# Patient Record
Sex: FEMALE | Employment: OTHER | URBAN - METROPOLITAN AREA
[De-identification: names, ages, dates, MRNs, and addresses within clinical notes are randomized per-mention and may not be internally consistent; named-entity substitution may affect disease eponyms.]

---

## 2018-04-10 ENCOUNTER — HOSPITAL ENCOUNTER (INPATIENT)
Age: 73
LOS: 4 days | Discharge: HOME | DRG: 872 | End: 2018-04-14
Attending: EMERGENCY MEDICINE | Admitting: INTERNAL MEDICINE
Payer: MEDICARE

## 2018-04-10 ENCOUNTER — APPOINTMENT (OUTPATIENT)
Dept: GENERAL RADIOLOGY | Age: 73
DRG: 872 | End: 2018-04-10
Attending: EMERGENCY MEDICINE
Payer: MEDICARE

## 2018-04-10 DIAGNOSIS — N39.0 URINARY TRACT INFECTION WITHOUT HEMATURIA, SITE UNSPECIFIED: Primary | ICD-10-CM

## 2018-04-10 DIAGNOSIS — I95.9 HYPOTENSION, UNSPECIFIED HYPOTENSION TYPE: ICD-10-CM

## 2018-04-10 PROBLEM — R65.20 SEVERE SEPSIS (HCC): Status: ACTIVE | Noted: 2018-04-10

## 2018-04-10 PROBLEM — F17.200 NICOTINE DEPENDENCE: Chronic | Status: ACTIVE | Noted: 2018-04-10

## 2018-04-10 PROBLEM — E87.6 HYPOKALEMIA: Status: ACTIVE | Noted: 2018-04-10

## 2018-04-10 PROBLEM — A41.9 SEVERE SEPSIS (HCC): Status: ACTIVE | Noted: 2018-04-10

## 2018-04-10 PROBLEM — E87.1 HYPONATREMIA: Status: ACTIVE | Noted: 2018-04-10

## 2018-04-10 LAB
ALBUMIN SERPL-MCNC: 3 G/DL (ref 3.5–5)
ALBUMIN/GLOB SERPL: 0.7 {RATIO} (ref 1.1–2.2)
ALP SERPL-CCNC: 86 U/L (ref 45–117)
ALT SERPL-CCNC: 16 U/L (ref 12–78)
ANION GAP SERPL CALC-SCNC: 9 MMOL/L (ref 5–15)
APPEARANCE UR: CLEAR
AST SERPL-CCNC: 14 U/L (ref 15–37)
ATRIAL RATE: 105 BPM
BACTERIA URNS QL MICRO: ABNORMAL /HPF
BASOPHILS # BLD: 0 K/UL (ref 0–0.1)
BASOPHILS # BLD: 0 K/UL (ref 0–0.1)
BASOPHILS NFR BLD: 0 % (ref 0–1)
BASOPHILS NFR BLD: 0 % (ref 0–1)
BILIRUB SERPL-MCNC: 1.2 MG/DL (ref 0.2–1)
BILIRUB UR QL CFM: NEGATIVE
BUN SERPL-MCNC: 12 MG/DL (ref 6–20)
BUN/CREAT SERPL: 11 (ref 12–20)
CALCIUM SERPL-MCNC: 9 MG/DL (ref 8.5–10.1)
CALCULATED P AXIS, ECG09: 57 DEGREES
CALCULATED R AXIS, ECG10: 10 DEGREES
CALCULATED T AXIS, ECG11: 78 DEGREES
CHLORIDE SERPL-SCNC: 91 MMOL/L (ref 97–108)
CO2 SERPL-SCNC: 28 MMOL/L (ref 21–32)
COLOR UR: ABNORMAL
CREAT SERPL-MCNC: 1.1 MG/DL (ref 0.55–1.02)
DIAGNOSIS, 93000: NORMAL
DIFFERENTIAL METHOD BLD: ABNORMAL
DIFFERENTIAL METHOD BLD: ABNORMAL
EOSINOPHIL # BLD: 0 K/UL (ref 0–0.4)
EOSINOPHIL # BLD: 0 K/UL (ref 0–0.4)
EOSINOPHIL NFR BLD: 0 % (ref 0–7)
EOSINOPHIL NFR BLD: 0 % (ref 0–7)
EPITH CASTS URNS QL MICRO: ABNORMAL /LPF
ERYTHROCYTE [DISTWIDTH] IN BLOOD BY AUTOMATED COUNT: 12.9 % (ref 11.5–14.5)
ERYTHROCYTE [DISTWIDTH] IN BLOOD BY AUTOMATED COUNT: 12.9 % (ref 11.5–14.5)
GLOBULIN SER CALC-MCNC: 4.2 G/DL (ref 2–4)
GLUCOSE SERPL-MCNC: 199 MG/DL (ref 65–100)
GLUCOSE UR STRIP.AUTO-MCNC: NEGATIVE MG/DL
HCT VFR BLD AUTO: 41.4 % (ref 35–47)
HCT VFR BLD AUTO: 50 % (ref 35–47)
HGB BLD-MCNC: 14.9 G/DL (ref 11.5–16)
HGB BLD-MCNC: 18.2 G/DL (ref 11.5–16)
HGB UR QL STRIP: ABNORMAL
HYALINE CASTS URNS QL MICRO: ABNORMAL /LPF (ref 0–5)
IMM GRANULOCYTES # BLD: 0.1 K/UL (ref 0–0.04)
IMM GRANULOCYTES # BLD: 0.1 K/UL (ref 0–0.04)
IMM GRANULOCYTES NFR BLD AUTO: 1 % (ref 0–0.5)
IMM GRANULOCYTES NFR BLD AUTO: 1 % (ref 0–0.5)
KETONES UR QL STRIP.AUTO: NEGATIVE MG/DL
LACTATE SERPL-SCNC: 1.3 MMOL/L (ref 0.4–2)
LEUKOCYTE ESTERASE UR QL STRIP.AUTO: ABNORMAL
LYMPHOCYTES # BLD: 0.5 K/UL (ref 0.8–3.5)
LYMPHOCYTES # BLD: 0.6 K/UL (ref 0.8–3.5)
LYMPHOCYTES NFR BLD: 5 % (ref 12–49)
LYMPHOCYTES NFR BLD: 6 % (ref 12–49)
MAGNESIUM SERPL-MCNC: 1.6 MG/DL (ref 1.6–2.4)
MCH RBC QN AUTO: 32.6 PG (ref 26–34)
MCH RBC QN AUTO: 32.7 PG (ref 26–34)
MCHC RBC AUTO-ENTMCNC: 36 G/DL (ref 30–36.5)
MCHC RBC AUTO-ENTMCNC: 36.4 G/DL (ref 30–36.5)
MCV RBC AUTO: 89.6 FL (ref 80–99)
MCV RBC AUTO: 90.8 FL (ref 80–99)
MONOCYTES # BLD: 0.8 K/UL (ref 0–1)
MONOCYTES # BLD: 1.2 K/UL (ref 0–1)
MONOCYTES NFR BLD: 12 % (ref 5–13)
MONOCYTES NFR BLD: 8 % (ref 5–13)
NEUTS SEG # BLD: 7.7 K/UL (ref 1.8–8)
NEUTS SEG # BLD: 9 K/UL (ref 1.8–8)
NEUTS SEG NFR BLD: 81 % (ref 32–75)
NEUTS SEG NFR BLD: 86 % (ref 32–75)
NITRITE UR QL STRIP.AUTO: POSITIVE
NRBC # BLD: 0 K/UL (ref 0–0.01)
NRBC # BLD: 0 K/UL (ref 0–0.01)
NRBC BLD-RTO: 0 PER 100 WBC
NRBC BLD-RTO: 0 PER 100 WBC
P-R INTERVAL, ECG05: 126 MS
PH UR STRIP: 6 [PH]
PLATELET # BLD AUTO: 148 K/UL (ref 150–400)
PLATELET # BLD AUTO: 164 K/UL (ref 150–400)
PMV BLD AUTO: 9.2 FL (ref 8.9–12.9)
PMV BLD AUTO: 9.5 FL (ref 8.9–12.9)
POTASSIUM SERPL-SCNC: 2.8 MMOL/L (ref 3.5–5.1)
PROT SERPL-MCNC: 7.2 G/DL (ref 6.4–8.2)
PROT UR STRIP-MCNC: 30 MG/DL
Q-T INTERVAL, ECG07: 326 MS
QRS DURATION, ECG06: 74 MS
QTC CALCULATION (BEZET), ECG08: 430 MS
RBC # BLD AUTO: 4.56 M/UL (ref 3.8–5.2)
RBC # BLD AUTO: 5.58 M/UL (ref 3.8–5.2)
RBC #/AREA URNS HPF: ABNORMAL /HPF (ref 0–5)
RBC MORPH BLD: ABNORMAL
RBC MORPH BLD: ABNORMAL
SODIUM SERPL-SCNC: 128 MMOL/L (ref 136–145)
SP GR UR REFRACTOMETRY: 1.02
TROPONIN I SERPL-MCNC: <0.04 NG/ML
UA: UC IF INDICATED,UAUC: ABNORMAL
UROBILINOGEN UR QL STRIP.AUTO: 0.2 EU/DL
VENTRICULAR RATE, ECG03: 105 BPM
WBC # BLD AUTO: 10.4 K/UL (ref 3.6–11)
WBC # BLD AUTO: 9.6 K/UL (ref 3.6–11)
WBC URNS QL MICRO: >100 /HPF (ref 0–4)

## 2018-04-10 PROCEDURE — 83735 ASSAY OF MAGNESIUM: CPT | Performed by: EMERGENCY MEDICINE

## 2018-04-10 PROCEDURE — 74011250636 HC RX REV CODE- 250/636: Performed by: EMERGENCY MEDICINE

## 2018-04-10 PROCEDURE — 99285 EMERGENCY DEPT VISIT HI MDM: CPT

## 2018-04-10 PROCEDURE — 65660000001 HC RM ICU INTERMED STEPDOWN

## 2018-04-10 PROCEDURE — 74011250637 HC RX REV CODE- 250/637: Performed by: INTERNAL MEDICINE

## 2018-04-10 PROCEDURE — 85025 COMPLETE CBC W/AUTO DIFF WBC: CPT | Performed by: EMERGENCY MEDICINE

## 2018-04-10 PROCEDURE — 87040 BLOOD CULTURE FOR BACTERIA: CPT | Performed by: EMERGENCY MEDICINE

## 2018-04-10 PROCEDURE — 74011000258 HC RX REV CODE- 258: Performed by: INTERNAL MEDICINE

## 2018-04-10 PROCEDURE — 83605 ASSAY OF LACTIC ACID: CPT | Performed by: EMERGENCY MEDICINE

## 2018-04-10 PROCEDURE — 36415 COLL VENOUS BLD VENIPUNCTURE: CPT | Performed by: EMERGENCY MEDICINE

## 2018-04-10 PROCEDURE — 87086 URINE CULTURE/COLONY COUNT: CPT | Performed by: EMERGENCY MEDICINE

## 2018-04-10 PROCEDURE — 74011250637 HC RX REV CODE- 250/637: Performed by: EMERGENCY MEDICINE

## 2018-04-10 PROCEDURE — 96360 HYDRATION IV INFUSION INIT: CPT

## 2018-04-10 PROCEDURE — 74011250636 HC RX REV CODE- 250/636: Performed by: INTERNAL MEDICINE

## 2018-04-10 PROCEDURE — 84484 ASSAY OF TROPONIN QUANT: CPT | Performed by: EMERGENCY MEDICINE

## 2018-04-10 PROCEDURE — 81001 URINALYSIS AUTO W/SCOPE: CPT | Performed by: EMERGENCY MEDICINE

## 2018-04-10 PROCEDURE — 87186 SC STD MICRODIL/AGAR DIL: CPT | Performed by: EMERGENCY MEDICINE

## 2018-04-10 PROCEDURE — 93005 ELECTROCARDIOGRAM TRACING: CPT

## 2018-04-10 PROCEDURE — 80053 COMPREHEN METABOLIC PANEL: CPT | Performed by: EMERGENCY MEDICINE

## 2018-04-10 PROCEDURE — 96361 HYDRATE IV INFUSION ADD-ON: CPT

## 2018-04-10 PROCEDURE — 74011000258 HC RX REV CODE- 258: Performed by: EMERGENCY MEDICINE

## 2018-04-10 PROCEDURE — 71046 X-RAY EXAM CHEST 2 VIEWS: CPT

## 2018-04-10 PROCEDURE — 87077 CULTURE AEROBIC IDENTIFY: CPT | Performed by: EMERGENCY MEDICINE

## 2018-04-10 RX ORDER — ATORVASTATIN CALCIUM 10 MG/1
10 TABLET, FILM COATED ORAL DAILY
COMMUNITY

## 2018-04-10 RX ORDER — SODIUM CHLORIDE 9 MG/ML
75 INJECTION, SOLUTION INTRAVENOUS CONTINUOUS
Status: DISCONTINUED | OUTPATIENT
Start: 2018-04-10 | End: 2018-04-13

## 2018-04-10 RX ORDER — POTASSIUM CHLORIDE 750 MG/1
40 TABLET, FILM COATED, EXTENDED RELEASE ORAL
Status: COMPLETED | OUTPATIENT
Start: 2018-04-10 | End: 2018-04-10

## 2018-04-10 RX ORDER — ZOLPIDEM TARTRATE 5 MG/1
5 TABLET ORAL
Status: DISCONTINUED | OUTPATIENT
Start: 2018-04-10 | End: 2018-04-14 | Stop reason: HOSPADM

## 2018-04-10 RX ORDER — TRIAMTERENE/HYDROCHLOROTHIAZID 37.5-25 MG
1 TABLET ORAL DAILY
COMMUNITY
End: 2018-04-14

## 2018-04-10 RX ORDER — POTASSIUM CHLORIDE 7.45 MG/ML
10 INJECTION INTRAVENOUS
Status: COMPLETED | OUTPATIENT
Start: 2018-04-10 | End: 2018-04-10

## 2018-04-10 RX ORDER — CETIRIZINE HCL 10 MG
10 TABLET ORAL DAILY
Status: DISCONTINUED | OUTPATIENT
Start: 2018-04-10 | End: 2018-04-14 | Stop reason: HOSPADM

## 2018-04-10 RX ORDER — ACETAMINOPHEN 500 MG
1000 TABLET ORAL
Status: COMPLETED | OUTPATIENT
Start: 2018-04-10 | End: 2018-04-10

## 2018-04-10 RX ORDER — SODIUM CHLORIDE 0.9 % (FLUSH) 0.9 %
5-10 SYRINGE (ML) INJECTION AS NEEDED
Status: DISCONTINUED | OUTPATIENT
Start: 2018-04-10 | End: 2018-04-14 | Stop reason: HOSPADM

## 2018-04-10 RX ORDER — ATORVASTATIN CALCIUM 10 MG/1
10 TABLET, FILM COATED ORAL
Status: DISCONTINUED | OUTPATIENT
Start: 2018-04-10 | End: 2018-04-14 | Stop reason: HOSPADM

## 2018-04-10 RX ORDER — CETIRIZINE HCL 10 MG
10 TABLET ORAL DAILY
COMMUNITY

## 2018-04-10 RX ADMIN — POTASSIUM CHLORIDE 10 MEQ: 10 INJECTION, SOLUTION INTRAVENOUS at 17:12

## 2018-04-10 RX ADMIN — CETIRIZINE HYDROCHLORIDE 10 MG: 10 TABLET, FILM COATED ORAL at 12:43

## 2018-04-10 RX ADMIN — SODIUM CHLORIDE 1000 ML: 900 INJECTION, SOLUTION INTRAVENOUS at 10:06

## 2018-04-10 RX ADMIN — POTASSIUM CHLORIDE 10 MEQ: 10 INJECTION, SOLUTION INTRAVENOUS at 14:17

## 2018-04-10 RX ADMIN — CEFTRIAXONE 1 G: 1 INJECTION, POWDER, FOR SOLUTION INTRAMUSCULAR; INTRAVENOUS at 20:50

## 2018-04-10 RX ADMIN — POTASSIUM CHLORIDE 10 MEQ: 10 INJECTION, SOLUTION INTRAVENOUS at 11:41

## 2018-04-10 RX ADMIN — POTASSIUM CHLORIDE 10 MEQ: 10 INJECTION, SOLUTION INTRAVENOUS at 12:45

## 2018-04-10 RX ADMIN — SODIUM CHLORIDE 1000 ML: 900 INJECTION, SOLUTION INTRAVENOUS at 09:25

## 2018-04-10 RX ADMIN — SODIUM CHLORIDE 75 ML/HR: 900 INJECTION, SOLUTION INTRAVENOUS at 12:38

## 2018-04-10 RX ADMIN — SODIUM CHLORIDE 1000 ML: 900 INJECTION, SOLUTION INTRAVENOUS at 11:08

## 2018-04-10 RX ADMIN — SODIUM CHLORIDE 1 G: 900 INJECTION, SOLUTION INTRAVENOUS at 11:08

## 2018-04-10 RX ADMIN — POTASSIUM CHLORIDE 40 MEQ: 750 TABLET, FILM COATED, EXTENDED RELEASE ORAL at 10:11

## 2018-04-10 RX ADMIN — ACETAMINOPHEN 1000 MG: 500 TABLET, FILM COATED ORAL at 09:25

## 2018-04-10 RX ADMIN — POTASSIUM CHLORIDE 10 MEQ: 10 INJECTION, SOLUTION INTRAVENOUS at 15:38

## 2018-04-10 NOTE — IP AVS SNAPSHOT
[address] 
[phone] Patient: Chema Morales MRN: [id number] RXW:[id number] About your hospitalization You were admitted on:  April 10, 2018 You last received care in the:  [facility]N MED SURG You were discharged on:  April 14, 2018 Why you were hospitalized Your primary diagnosis was:  Severe Sepsis (Hcc) Your diagnoses also included:  Uti (Urinary Tract Infection), Hypokalemia, Hyponatremia, Nicotine Dependence Follow-up Information Follow up With Details Comments Contact Info Diana Malik MD Schedule an appointment as soon as possible for a visit in 1 week  [address] [facility] [address] 
[phone] Discharge Orders None A check césar indicates which time of day the medication should be taken. My Medications START taking these medications Instructions Each Dose to Equal  
 Morning Noon Evening Bedtime  
 cefUROXime 250 mg tablet Commonly known as:  CEFTIN Your last dose was: Your next dose is: Take 1 Tab by mouth two (2) times a day for 6 days. 250 mg CONTINUE taking these medications Instructions Each Dose to Equal  
 Morning Noon Evening Bedtime AMBIEN 5 mg tablet Generic drug:  zolpidem Your last dose was: Your next dose is: Take  by mouth nightly as needed for Sleep. ANACIN 400-32 mg Tab Generic drug:  aspirin-caffeine Your last dose was: Your next dose is: Take 2 Tabs by mouth daily. 2 Tab  
    
   
   
   
  
 atorvastatin 10 mg tablet Commonly known as:  LIPITOR Your last dose was: Your next dose is: Take 10 mg by mouth daily. 10 mg  
    
   
   
   
  
 ZyrTEC 10 mg tablet Generic drug:  cetirizine Your last dose was: Your next dose is: Take 10 mg by mouth daily. 10 mg  
    
   
   
   
  
  
STOP taking these medications   
 triamterene-hydroCHLOROthiazide 37.5-25 mg per tablet Commonly known as:  Bry Grant Where to Get Your Medications Information on where to get these meds will be given to you by the nurse or doctor. ! Ask your nurse or doctor about these medications  
  cefUROXime 250 mg tablet Discharge Instructions Discharge Instructions PATIENT ID: Jayden Nielsen MRN: [id number] YOB: 1945 DATE OF ADMISSION: 4/10/2018  8:22 AM   
DATE OF DISCHARGE: 4/14/2018 PRIMARY CARE PROVIDER: Lucas Murdock MD  
 
ATTENDING PHYSICIAN: Sami Anna MD 
DISCHARGING PROVIDER: Sami Anna MD   
To contact this individual call [phone] and ask the  to page. If unavailable ask to be transferred the Adult Hospitalist Department. DISCHARGE DIAGNOSES 1. Urinary tract infection 2. Pyelonephritis 3. Sepsis CONSULTATIONS: IP CONSULT TO HOSPITALIST 
 
PROCEDURES/SURGERIES: * No surgery found * PENDING TEST RESULTS:  
At the time of discharge the following test results are still pending: none FOLLOW UP APPOINTMENTS:  
Follow-up Information Follow up With Details Comments Contact Info Tiffany Burleson MD Schedule an appointment as soon as possible for a visit in 1 week  [address] [address] 
[phone] ADDITIONAL CARE RECOMMENDATIONS: none DIET: Regular Diet ACTIVITY: Activity as tolerated WOUND CARE: none EQUIPMENT needed: none DISCHARGE MEDICATIONS: 
 See Medication Reconciliation Form · It is important that you take the medication exactly as they are prescribed. · Keep your medication in the bottles provided by the pharmacist and keep a list of the medication names, dosages, and times to be taken in your wallet. · Do not take other medications without consulting your doctor. NOTIFY YOUR PHYSICIAN FOR ANY OF THE FOLLOWING:  
Fever over 101 degrees for 24 hours. Chest pain, shortness of breath, fever, chills, nausea, vomiting, diarrhea, change in mentation, falling, weakness, bleeding. Severe pain or pain not relieved by medications. Or, any other signs or symptoms that you may have questions about. DISPOSITION: 
  Home With: SELF 
 OT  PT  HH  RN  
  
 SNF/Inpatient Rehab/LTAC Independent/assisted living Hospice Other: CDMP Checked:  
Yes x PROBLEM LIST Updated: 
Yes x Signed:  
Clayton Adkins MD 
4/14/2018 
8:40 AM 
 
  
  
  
Adatao Announcement We are excited to announce that we are making your provider's discharge notes available to you in Adatao. You will see these notes when they are completed and signed by the physician that discharged you from your recent hospital stay. If you have any questions or concerns about any information you see in Adatao, please call the Health Information Department where you were seen or reach out to your Primary Care Provider for more information about your plan of care. Introducing [facility]! [employer] introduces Adatao patient portal. Now you can access parts of your medical record, email your doctor's office, and request medication refills online. 1. In your internet browser, go to [email or web address] 2. Click on the First Time User? Click Here link in the Sign In box. You will see the New Member Sign Up page. 3. Enter your Adatao Access Code exactly as it appears below. You will not need to use this code after youve completed the sign-up process. If you do not sign up before the expiration date, you must request a new code. · Adatao Access Code: [email or web address] Expires: 7/9/2018  8:16 AM 
 
4.  Enter the last four digits of your Social Security Number (xxxx) and Date of Birth (mm/dd/yyyy) as indicated and click Submit. You will be taken to the next sign-up page. 5. Create a [employer] ID. This will be your [employer] login ID and cannot be changed, so think of one that is secure and easy to remember. 6. Create a [employer] password. You can change your password at any time. 7. Enter your Password Reset Question and Answer. This can be used at a later time if you forget your password. 8. Enter your e-mail address. You will receive e-mail notification when new information is available in [address] 9. Click Sign Up. You can now view and download portions of your medical record. 10. Click the Download Summary menu link to download a portable copy of your medical information. If you have questions, please visit the Frequently Asked Questions section of the [employer] website. Remember, [employer] is NOT to be used for urgent needs. For medical emergencies, dial 911. Now available from your iPhone and Android! Introducing Domo Montes As a [facility] patient, I wanted to make you aware of our electronic visit tool called Domo Haydenambrosioliz. [facility] 24/7 allows you to connect within minutes with a medical provider 24 hours a day, seven days a week via a mobile device or tablet or logging into a secure website from your computer. You can access Domo Montes from anywhere in the [place]. A virtual visit might be right for you when you have a simple condition and feel like you just dont want to get out of bed, or cant get away from work for an appointment, when your regular [facility] provider is not available (evenings, weekends or holidays), or when youre out of town and need minor care. Electronic visits cost only $49 and if the [facility] [employer] 24/7 provider determines a prescription is needed to treat your condition, one can be electronically transmitted to a nearby pharmacy*. Please take a moment to enroll today if you have not already done so. The enrollment process is free and takes just a few minutes. To enroll, please download the [employer] 24/7 damian to your tablet or phone, or visit [email or web address] to enroll on your computer. And, as an [address] [place] [address] patient with a [employer] account, the results of your visits will be scanned into your electronic medical record and your primary care provider will be able to view the scanned results. We urge you to continue to see your regular [employer] provider for your ongoing medical care. And while your primary care provider may not be the one available when you seek a [employer]edic[employer] virtual visit, the peace of mind you get from getting a real diagnosis real time can be priceless. For more information on Praedicat, view our Frequently Asked Questions (FAQs) at [email or web address]. Sincerely, 
 
Francia Vincent MD 
Chief Medical Officer [facility] Kimmy Nir *:  certain medications cannot be prescribed via Praedicat Unresulted Labs-Please follow up with your PCP about these lab tests Order Current Status CULTURE, BLOOD, PAIRED Preliminary result Providers Seen During Your Hospitalization Provider Specialty Primary office phone Jasmine Comer MD Emergency Medicine [phone] Madeline Martinez MD Internal Medicine [phone] Ashlee Babin MD Internal Medicine [phone] Jayy Vargas MD Internal Medicine [phone] Your Primary Care Physician (PCP) Primary Care Physician Office Phone Office Fax Uma Lobato [phone] You are allergic to the following No active allergies Recent Documentation Height Weight BMI OB Status Smoking Status 1.778 m 93.9 kg 29.7 kg/m2 Postmenopausal Current Every Day Smoker Emergency Contacts Name Discharge Info Relation Home Work Mobile Jenny Jones DISCHARGE CAREGIVER [3] Friend [5]   [phone] Prashanth Moreno DISCHARGE CAREGIVER [3] Child [2]   [phone] VidalOneyda joiner DISCHARGE CAREGIVER [3] Friend [5] [phone] [address] [facility]  Other Relative [6] [phone] Patient Belongings The following personal items are in your possession at time of discharge: 
  Dental Appliances: None  Visual Aid: Glasses, At bedside, With patient      Home Medications: None   Jewelry: Ring, Bracelet  Clothing: Footwear, Pants, Shirt    Other Valuables: Attila Lomas Please provide this summary of care documentation to your next provider. Signatures-by signing, you are acknowledging that this After Visit Summary has been reviewed with you and you have received a copy. Patient Signature:  ____________________________________________________________ Date:  ____________________________________________________________  
  
[facility] Provider Signature:  ____________________________________________________________ Date:  ____________________________________________________________

## 2018-04-10 NOTE — PROGRESS NOTES
Admission Medication Reconciliation:    Information obtained from:  Patient, Rx Query    Comments/Recommendations: Updated PTA meds. Confirmed NKDA status. 1)  Patient states that she takes Anacin (aspirin with caffeine), 1 to 2 tabs daily for pain. She did not know what strength she takes, added to PTA med list.    2)  Added triamterene-hydrochlorothiazide, atorvastatin, cetirizine. 3)  Discontinued albuterol HFA, as patient states she is no longer taking. Significant PMH/Disease States:   Past Medical History:   Diagnosis Date    Essential hypertension     Sun-damaged skin        Chief Complaint for this Admission:    Chief Complaint   Patient presents with    Fever    Headache    Fatigue       Allergies:  Review of patient's allergies indicates no known allergies. Prior to Admission Medications:   Prior to Admission Medications   Prescriptions Last Dose Informant Patient Reported? Taking?   aspirin-caffeine (ANACIN) 400-32 mg tab 4/10/2018 at 0100  Yes Yes   Sig: Take 2 Tabs by mouth daily. atorvastatin (LIPITOR) 10 mg tablet 4/9/2018  Yes Yes   Sig: Take 10 mg by mouth daily. cetirizine (ZYRTEC) 10 mg tablet   Yes Yes   Sig: Take 10 mg by mouth daily. triamterene-hydroCHLOROthiazide (MAXZIDE) 37.5-25 mg per tablet 4/9/2018  Yes Yes   Sig: Take 1 Tab by mouth daily. zolpidem (AMBIEN) 5 mg tablet 4/9/2018  Yes Yes   Sig: Take  by mouth nightly as needed for Sleep.       Facility-Administered Medications: None

## 2018-04-10 NOTE — ED PROVIDER NOTES
HPI Comments: 67 y.o. female with past medical history significant for hypertension who presents, accompanied by family, with chief complaint of headache. Patient complains of 3 days of frontal headache, subjective fever, nausea, and occasional vomiting. Patient states she vomited 4 times at onset of symptoms, then had no more vomiting until one episode this morning. Patient also complains of suprapubic discomfort, urinary frequency, and difficulty urinating. Patient states at night she has been having trouble sleeping because she is \"seeing people who aren't there, writing essays on my iPad. \"  Patient also notes for the past few weeks she has felt \"irregular\" heartbeat. Patient has no prior history of atrial fibrillation. Patient denies diarrhea, new cough or congustion, chest pain, dysuria, hematuria. There are no other acute medical concerns at this time. Social hx: Current everyday smoker  PCP: Suha Villafana MD    Note written by Grady Soria. Georgeanna English, as dictated by Ramiro Iniguez MD 8:32 AM       The history is provided by the patient and a relative. Past Medical History:   Diagnosis Date    Essential hypertension     Sun-damaged skin        Past Surgical History:   Procedure Laterality Date    HX CATARACT REMOVAL Right 2012    HX  SECTION      HX TUMOR REMOVAL  1983    Tumor on Neck         No family history on file. Social History     Social History    Marital status:      Spouse name: N/A    Number of children: N/A    Years of education: N/A     Occupational History    Not on file.      Social History Main Topics    Smoking status: Current Every Day Smoker     Packs/day: 1.00    Smokeless tobacco: Never Used    Alcohol use Yes      Comment: 2-3 occasional cocktails     Drug use: No    Sexual activity: Not on file     Other Topics Concern    Not on file     Social History Narrative         ALLERGIES: Review of patient's allergies indicates no known allergies. Review of Systems   Constitutional: Positive for fatigue and fever. HENT: Negative for congestion. Eyes: Negative for visual disturbance. Respiratory: Negative for cough, shortness of breath and wheezing. Cardiovascular: Negative for chest pain and leg swelling. Gastrointestinal: Positive for abdominal pain, nausea and vomiting. Negative for diarrhea. Genitourinary: Positive for difficulty urinating, frequency and urgency. Negative for dysuria and hematuria. Musculoskeletal: Negative. Negative for back pain and neck stiffness. Skin: Negative for rash. Neurological: Positive for headaches. Negative for syncope. Psychiatric/Behavioral: Positive for confusion and hallucinations. All other systems reviewed and are negative. Vitals:    04/10/18 0930 04/10/18 0945 04/10/18 1015 04/10/18 1029   BP: 115/71 (!) 92/20 95/57 107/45   Pulse: (!) 107 97 96 81   Resp: 29 17 23 20   Temp:    98.7 °F (37.1 °C)   SpO2: 96% 95% 97% 96%   Weight:       Height:                Physical Exam   Constitutional: She appears well-developed and well-nourished. No distress. HENT:   Head: Normocephalic. Eyes: Pupils are equal, round, and reactive to light. Neck: Normal range of motion. Neck supple. Cardiovascular: An irregular rhythm present. Tachycardia present. No murmur heard. Pulmonary/Chest: Effort normal. No respiratory distress. She has decreased breath sounds. Abdominal: Soft. There is no tenderness. Musculoskeletal: Normal range of motion. She exhibits no edema. Neurological: She is alert. She has normal strength. No cranial nerve deficit. Skin: Skin is warm and dry. Psychiatric: She has a normal mood and affect. Her behavior is normal.   Nursing note and vitals reviewed. Note written by Edouard Goetz. Yeni Vidal, as dictated by Nohelia Dennis MD 8:35 AM       [facility]      ED Course       Procedures    ED EKG interpretation:  Rhythm: sinus tachycardia with frequent PACs;  Rate (approx.): 105; ST/T wave: non-specific changes; Note written by Jayce Barnes. Matthew Gan, as dictated by Keven Kerr MD 9:03 AM    CONSULT NOTE:  10:35 AM Keven Kerr MD spoke with Dr. James Cramer, Consult for Hospitalist.  Discussed available diagnostic tests and clinical findings. Dr. James Cramer will admit patient.

## 2018-04-10 NOTE — ED TRIAGE NOTES
Pt reports she has had a fever, headache and generalized fatigue for the past several days. Pt has been taking ASA for her symptoms with minimal relief. Pt also reports urinary frequency with scant amt of output. Pt temp in triage 99.4.

## 2018-04-10 NOTE — H&P
History & Physical  Irina Garces MD, [facility]    Date of admission: 4/10/2018    Patient name: Wesley Cano  MRN: [id number]  YOB: 1945  Age: 67 y.o. Primary care provider:  Licha May MD     Source of Information: patient, medical records                              Chief complaint: generalized malaise    History of present illness  Wesley Cano is a 67 y.o. female with HTN presented to the ED from home with headache, nausea, vomiting, fatigue, weakness, and urinary complaints. Presentation was similar to her previous admission in Dec 2015. Patient started vomiting about 3 days ago and has had multiple episodes. She c/o chills and subjective fever and dysuria. She has not been recently hospitalized or on antibiotics and denies sick contacts. Patient was told in the past not to take blood pressure meds but was recently started on triamterene-HCTZ. ED triage VS were temp 99.4F peaking at 100.5F, , BP 95/63, RR 16, SpO2 92% on RA. In the ED, she was given APAP 1g po x1, cefazolin 1g IVx1, KCl 10mEq IV x4, KCl 40mEq po x1, IVNS bolus x3L. Past Medical History:   Diagnosis Date    Essential hypertension     Sun-damaged skin       Past Surgical History:   Procedure Laterality Date    HX CATARACT REMOVAL Right     HX  SECTION      HX TUMOR REMOVAL  1983    Tumor on Neck     Prior to Admission medications    Medication Sig Start Date End Date Taking? Authorizing Provider   atorvastatin (LIPITOR) 10 mg tablet Take 10 mg by mouth daily. Yes Historical Provider   triamterene-hydroCHLOROthiazide (MAXZIDE) 37.5-25 mg per tablet Take 1 Tab by mouth daily. Yes Historical Provider   aspirin-caffeine (ANACIN) 400-32 mg tab Take 2 Tabs by mouth daily. Yes Historical Provider   cetirizine (ZYRTEC) 10 mg tablet Take 10 mg by mouth daily.    Yes Historical Provider   zolpidem (AMBIEN) 5 mg tablet Take  by mouth nightly as needed for Sleep. Yes Phys Other, MD     No Known Allergies     Family history - significant for cancer     Social history  Patient resides  x  Independently      With family care      Assisted living      SNF    Ambulates  x  Independently      With cane       Assisted walker         Alcohol history     None   x  Social but very infrequently     Chronic   Smoking history    None     Former smoker   x  Current smoker: 2 packs cigarettes daily     Denies illicit drug use. History   Smoking Status    Current Every Day Smoker    Packs/day: 1.00   Smokeless Tobacco    Never Used       Code status  x  Full code     DNR/DNI        Code status discussed with the patient. Review of systems  A comprehensive review of systems was negative except for that written in the History of Present Illness.      Physical Examination   Visit Vitals    /45 (BP 1 Location: Right arm, BP Patient Position: At rest)    Pulse 81    Temp 98.7 °F (37.1 °C)    Resp 20    Ht 5' 10\" (1.778 m)    Wt 81.2 kg (179 lb 0.2 oz)    SpO2 96%    BMI 25.69 kg/m2          O2 Device: Room air    Gen: awake, NAD, cooperative, pleasant  Head: NCAT  EENT: PERRL, EOMI, MMM, oropharynx benign  Neck: supple, no masses  Lungs: CTAB, no w/r/r  CVS: regular rhythm, normal rate, S1S2, no m/r/g appreciated, no peripheral edema, +pulses  Abd: +BS, soft, NT, ND  MSK: moves all extremities  Neuro: AOx3, CN II-XII grossly intact, NFD, responds appropriately to questions and commands  Skin: warm, dry; no rashes, lesions, ulcers noted    Data Review    EKG: ST rate 105, PVCs    24 Hour Results:  Recent Results (from the past 24 hour(s))   CBC WITH AUTOMATED DIFF    Collection Time: 04/10/18  8:57 AM   Result Value Ref Range    WBC 10.4 3.6 - 11.0 K/uL    RBC 5.58 (H) 3.80 - 5.20 M/uL    HGB 18.2 (H) 11.5 - 16.0 g/dL    HCT 50.0 (H) 35.0 - 47.0 %    MCV 89.6 80.0 - 99.0 FL    MCH 32.6 26.0 - 34.0 PG    MCHC 36.4 30.0 - 36.5 g/dL    RDW 12.9 11.5 - 14.5 %    PLATELET 197 218 - 294 K/uL    MPV 9.2 8.9 - 12.9 FL    NRBC 0.0 0  WBC    ABSOLUTE NRBC 0.00 0.00 - 0.01 K/uL    NEUTROPHILS 86 (H) 32 - 75 %    LYMPHOCYTES 5 (L) 12 - 49 %    MONOCYTES 8 5 - 13 %    EOSINOPHILS 0 0 - 7 %    BASOPHILS 0 0 - 1 %    IMMATURE GRANULOCYTES 1 (H) 0.0 - 0.5 %    ABS. NEUTROPHILS 9.0 (H) 1.8 - 8.0 K/UL    ABS. LYMPHOCYTES 0.5 (L) 0.8 - 3.5 K/UL    ABS. MONOCYTES 0.8 0.0 - 1.0 K/UL    ABS. EOSINOPHILS 0.0 0.0 - 0.4 K/UL    ABS. BASOPHILS 0.0 0.0 - 0.1 K/UL    ABS. IMM. GRANS. 0.1 (H) 0.00 - 0.04 K/UL    DF SMEAR SCANNED      RBC COMMENTS NORMOCYTIC, NORMOCHROMIC     METABOLIC PANEL, COMPREHENSIVE    Collection Time: 04/10/18  8:57 AM   Result Value Ref Range    Sodium 128 (L) 136 - 145 mmol/L    Potassium 2.8 (L) 3.5 - 5.1 mmol/L    Chloride 91 (L) 97 - 108 mmol/L    CO2 28 21 - 32 mmol/L    Anion gap 9 5 - 15 mmol/L    Glucose 199 (H) 65 - 100 mg/dL    BUN 12 6 - 20 MG/DL    Creatinine 1.10 (H) 0.55 - 1.02 MG/DL    BUN/Creatinine ratio 11 (L) 12 - 20      GFR est AA 59 (L) >60 ml/min/1.73m2    GFR est non-AA 49 (L) >60 ml/min/1.73m2    Calcium 9.0 8.5 - 10.1 MG/DL    Bilirubin, total 1.2 (H) 0.2 - 1.0 MG/DL    ALT (SGPT) 16 12 - 78 U/L    AST (SGOT) 14 (L) 15 - 37 U/L    Alk.  phosphatase 86 45 - 117 U/L    Protein, total 7.2 6.4 - 8.2 g/dL    Albumin 3.0 (L) 3.5 - 5.0 g/dL    Globulin 4.2 (H) 2.0 - 4.0 g/dL    A-G Ratio 0.7 (L) 1.1 - 2.2     TROPONIN I    Collection Time: 04/10/18  8:57 AM   Result Value Ref Range    Troponin-I, Qt. <0.04 <0.05 ng/mL   LACTIC ACID    Collection Time: 04/10/18  8:57 AM   Result Value Ref Range    Lactic acid 1.3 0.4 - 2.0 MMOL/L   URINALYSIS W/ REFLEX CULTURE    Collection Time: 04/10/18  8:57 AM   Result Value Ref Range    Color DARK YELLOW      Appearance CLEAR      Specific gravity 1.020      pH (UA) 6.0      Protein 30 mg/dL    Glucose NEGATIVE  mg/dL    Ketone NEGATIVE  mg/dL    Blood MODERATE      Urobilinogen 0.2 EU/dL Nitrites POSITIVE      Leukocyte Esterase LARGE      WBC >100 (H) 0 - 4 /hpf    RBC 10-20 0 - 5 /hpf    Epithelial cells MANY (A) FEW /lpf    Bacteria 4+ (A) NEG /hpf    UA:UC IF INDICATED URINE CULTURE ORDERED (A) CNI      Hyaline cast 2-5 0 - 5 /lpf   BILIRUBIN, CONFIRM    Collection Time: 04/10/18  8:57 AM   Result Value Ref Range    Bilirubin UA, confirm NEGATIVE  NEG     MAGNESIUM    Collection Time: 04/10/18  8:57 AM   Result Value Ref Range    Magnesium 1.6 1.6 - 2.4 mg/dL   EKG, 12 LEAD, INITIAL    Collection Time: 04/10/18  8:57 AM   Result Value Ref Range    Ventricular Rate 105 BPM    Atrial Rate 105 BPM    P-R Interval 126 ms    QRS Duration 74 ms    Q-T Interval 326 ms    QTC Calculation (Bezet) 430 ms    Calculated P Axis 57 degrees    Calculated R Axis 10 degrees    Calculated T Axis 78 degrees    Diagnosis       Sinus tachycardia with premature supraventricular complexes  Nonspecific ST and T wave abnormality  No previous ECGs available     CBC WITH AUTOMATED DIFF    Collection Time: 04/10/18 10:07 AM   Result Value Ref Range    WBC 9.6 3.6 - 11.0 K/uL    RBC 4.56 3.80 - 5.20 M/uL    HGB 14.9 11.5 - 16.0 g/dL    HCT 41.4 35.0 - 47.0 %    MCV 90.8 80.0 - 99.0 FL    MCH 32.7 26.0 - 34.0 PG    MCHC 36.0 30.0 - 36.5 g/dL    RDW 12.9 11.5 - 14.5 %    PLATELET 066 (L) 783 - 400 K/uL    MPV 9.5 8.9 - 12.9 FL    NRBC 0.0 0  WBC    ABSOLUTE NRBC 0.00 0.00 - 0.01 K/uL    NEUTROPHILS 81 (H) 32 - 75 %    LYMPHOCYTES 6 (L) 12 - 49 %    MONOCYTES 12 5 - 13 %    EOSINOPHILS 0 0 - 7 %    BASOPHILS 0 0 - 1 %    IMMATURE GRANULOCYTES 1 (H) 0.0 - 0.5 %    ABS. NEUTROPHILS 7.7 1.8 - 8.0 K/UL    ABS. LYMPHOCYTES 0.6 (L) 0.8 - 3.5 K/UL    ABS. MONOCYTES 1.2 (H) 0.0 - 1.0 K/UL    ABS. EOSINOPHILS 0.0 0.0 - 0.4 K/UL    ABS. BASOPHILS 0.0 0.0 - 0.1 K/UL    ABS. IMM.  GRANS. 0.1 (H) 0.00 - 0.04 K/UL    DF SMEAR SCANNED      RBC COMMENTS NORMOCYTIC, NORMOCHROMIC       Recent Labs      04/10/18   1007  04/10/18   0857 WBC  9.6  10.4   HGB  14.9  18.2*   HCT  41.4  50.0*   PLT  148*  164     Recent Labs      04/10/18   0857   NA  128*   K  2.8*   CL  91*   CO2  28   GLU  199*   BUN  12   CREA  1.10*   CA  9.0   MG  1.6   ALB  3.0*   SGOT  14*   ALT  16       Imaging  CXR PA/lat - no acute cardiopulmonary process    Assessment and Plan   Principal Problem:    Severe sepsis due to UTI ([address] (POA)  - admit inpatient IMCU under sepsis protocol and if BP does not respond to IVF, may need to transfer to ICU for pressor  - hold home BP meds and continue IVNS or may switch to LR  - BCx 4/10 pending  - UCx 4/10 pending  - s/p empiric cefazolin x1 in ED; will switch to ceftriaxone and adjust prn  - IVF  - PT/OT evals prior to discharge    Active Problems:    Hypokalemia (POA) - replete prn      Hyponatremia, recurrent (POA) - likely due to GI loss from vomiting and decreased po intake  - start IVNS  - stop triamteren-HCTZ  - recheck labs in AM  - asymptomatic      Nicotine dependence (POA) - cessation counseling, decline nicotine patch    Diet: CLD and ADAT  Activity: ambulate with assistance  DVT prophylaxis: SCDs  Isolation precautions: none  Consultations: none  Anticipated disposition: TBD pending clinical course       Signed by: Deonte Richardson MD    April 10, 2018 at 11:04 AM

## 2018-04-10 NOTE — ED NOTES
Pt given clear liquid diet tray for lunch, tolerated well.  Pt resting comfortably in bed talking to family over the phone

## 2018-04-10 NOTE — IP AVS SNAPSHOT
[facility] 
[phone] Patient: Aleisha Hunter MRN: [id number] VLN:[id number] A check césar indicates which time of day the medication should be taken. My Medications START taking these medications Instructions Each Dose to Equal  
 Morning Noon Evening Bedtime  
 cefUROXime 250 mg tablet Commonly known as:  CEFTIN Your last dose was: Your next dose is: Take 1 Tab by mouth two (2) times a day for 6 days. 250 mg CONTINUE taking these medications Instructions Each Dose to Equal  
 Morning Noon Evening Bedtime AMBIEN 5 mg tablet Generic drug:  zolpidem Your last dose was: Your next dose is: Take  by mouth nightly as needed for Sleep. ANACIN 400-32 mg Tab Generic drug:  aspirin-caffeine Your last dose was: Your next dose is: Take 2 Tabs by mouth daily. 2 Tab  
    
   
   
   
  
 atorvastatin 10 mg tablet Commonly known as:  LIPITOR Your last dose was: Your next dose is: Take 10 mg by mouth daily. 10 mg  
    
   
   
   
  
 ZyrTEC 10 mg tablet Generic drug:  cetirizine Your last dose was: Your next dose is: Take 10 mg by mouth daily. 10 mg  
    
   
   
   
  
  
STOP taking these medications   
 triamterene-hydroCHLOROthiazide 37.5-25 mg per tablet Commonly known as:  Mahi Lemon Where to Get Your Medications Information on where to get these meds will be given to you by the nurse or doctor. ! Ask your nurse or doctor about these medications  
  cefUROXime 250 mg tablet

## 2018-04-10 NOTE — ED NOTES
1210: Pt is alert and oriented. SBP remains low, IV fluid bolus infusing. Pt has no complaints at this time, otherwise VSS. 1215: Bedside and Verbal shift change report given to Romario Brown RN (oncoming nurse) by Esvin Wiggins RN (offgoing nurse). Report included the following information SBAR, Kardex, ED Summary, [facility] and Recent Results.      Visit Vitals    /86    Pulse 76    Temp 98.7 °F (37.1 °C)    Resp 23    Ht 5' 10\" (1.778 m)    Wt 81.2 kg (179 lb 0.2 oz)    SpO2 97%    BMI 25.69 kg/m2

## 2018-04-11 ENCOUNTER — APPOINTMENT (OUTPATIENT)
Dept: CT IMAGING | Age: 73
DRG: 872 | End: 2018-04-11
Attending: HOSPITALIST
Payer: MEDICARE

## 2018-04-11 LAB
ALBUMIN SERPL-MCNC: 2.2 G/DL (ref 3.5–5)
ALBUMIN/GLOB SERPL: 0.6 {RATIO} (ref 1.1–2.2)
ALP SERPL-CCNC: 61 U/L (ref 45–117)
ALT SERPL-CCNC: 14 U/L (ref 12–78)
ANION GAP SERPL CALC-SCNC: 7 MMOL/L (ref 5–15)
AST SERPL-CCNC: 26 U/L (ref 15–37)
BASOPHILS # BLD: 0 K/UL (ref 0–0.1)
BASOPHILS NFR BLD: 1 % (ref 0–1)
BILIRUB SERPL-MCNC: 0.6 MG/DL (ref 0.2–1)
BUN SERPL-MCNC: 9 MG/DL (ref 6–20)
BUN/CREAT SERPL: 13 (ref 12–20)
CALCIUM SERPL-MCNC: 8.2 MG/DL (ref 8.5–10.1)
CHLORIDE SERPL-SCNC: 102 MMOL/L (ref 97–108)
CO2 SERPL-SCNC: 25 MMOL/L (ref 21–32)
CREAT SERPL-MCNC: 0.72 MG/DL (ref 0.55–1.02)
DIFFERENTIAL METHOD BLD: ABNORMAL
EOSINOPHIL # BLD: 0 K/UL (ref 0–0.4)
EOSINOPHIL NFR BLD: 0 % (ref 0–7)
ERYTHROCYTE [DISTWIDTH] IN BLOOD BY AUTOMATED COUNT: 13.2 % (ref 11.5–14.5)
GLOBULIN SER CALC-MCNC: 3.5 G/DL (ref 2–4)
GLUCOSE SERPL-MCNC: 161 MG/DL (ref 65–100)
HCT VFR BLD AUTO: 41.5 % (ref 35–47)
HGB BLD-MCNC: 14.6 G/DL (ref 11.5–16)
IMM GRANULOCYTES # BLD: 0 K/UL (ref 0–0.04)
IMM GRANULOCYTES NFR BLD AUTO: 0 % (ref 0–0.5)
LYMPHOCYTES # BLD: 0.8 K/UL (ref 0.8–3.5)
LYMPHOCYTES NFR BLD: 11 % (ref 12–49)
MCH RBC QN AUTO: 33 PG (ref 26–34)
MCHC RBC AUTO-ENTMCNC: 35.2 G/DL (ref 30–36.5)
MCV RBC AUTO: 93.7 FL (ref 80–99)
MONOCYTES # BLD: 0.9 K/UL (ref 0–1)
MONOCYTES NFR BLD: 12 % (ref 5–13)
NEUTS SEG # BLD: 5.6 K/UL (ref 1.8–8)
NEUTS SEG NFR BLD: 75 % (ref 32–75)
NRBC # BLD: 0 K/UL (ref 0–0.01)
NRBC BLD-RTO: 0 PER 100 WBC
PLATELET # BLD AUTO: 159 K/UL (ref 150–400)
PMV BLD AUTO: 9.8 FL (ref 8.9–12.9)
POTASSIUM SERPL-SCNC: 4 MMOL/L (ref 3.5–5.1)
PROT SERPL-MCNC: 5.7 G/DL (ref 6.4–8.2)
RBC # BLD AUTO: 4.43 M/UL (ref 3.8–5.2)
SODIUM SERPL-SCNC: 134 MMOL/L (ref 136–145)
WBC # BLD AUTO: 7.4 K/UL (ref 3.6–11)

## 2018-04-11 PROCEDURE — 74011250636 HC RX REV CODE- 250/636: Performed by: HOSPITALIST

## 2018-04-11 PROCEDURE — 74011250637 HC RX REV CODE- 250/637: Performed by: HOSPITALIST

## 2018-04-11 PROCEDURE — 74011250637 HC RX REV CODE- 250/637: Performed by: INTERNAL MEDICINE

## 2018-04-11 PROCEDURE — 74011250636 HC RX REV CODE- 250/636

## 2018-04-11 PROCEDURE — 74011250636 HC RX REV CODE- 250/636: Performed by: INTERNAL MEDICINE

## 2018-04-11 PROCEDURE — 65660000000 HC RM CCU STEPDOWN

## 2018-04-11 PROCEDURE — 36415 COLL VENOUS BLD VENIPUNCTURE: CPT | Performed by: INTERNAL MEDICINE

## 2018-04-11 PROCEDURE — C8929 TTE W OR WO FOL WCON,DOPPLER: HCPCS

## 2018-04-11 PROCEDURE — 74177 CT ABD & PELVIS W/CONTRAST: CPT

## 2018-04-11 PROCEDURE — 85025 COMPLETE CBC W/AUTO DIFF WBC: CPT | Performed by: INTERNAL MEDICINE

## 2018-04-11 PROCEDURE — 74011000258 HC RX REV CODE- 258: Performed by: HOSPITALIST

## 2018-04-11 PROCEDURE — 80053 COMPREHEN METABOLIC PANEL: CPT | Performed by: INTERNAL MEDICINE

## 2018-04-11 PROCEDURE — 77030032490 HC SLV COMPR SCD KNE COVD -B

## 2018-04-11 PROCEDURE — 74011000258 HC RX REV CODE- 258: Performed by: INTERNAL MEDICINE

## 2018-04-11 PROCEDURE — 74011636320 HC RX REV CODE- 636/320: Performed by: HOSPITALIST

## 2018-04-11 RX ORDER — SODIUM CHLORIDE 0.9 % (FLUSH) 0.9 %
10 SYRINGE (ML) INJECTION
Status: COMPLETED | OUTPATIENT
Start: 2018-04-11 | End: 2018-04-11

## 2018-04-11 RX ORDER — ONDANSETRON 2 MG/ML
4 INJECTION INTRAMUSCULAR; INTRAVENOUS
Status: DISCONTINUED | OUTPATIENT
Start: 2018-04-11 | End: 2018-04-14 | Stop reason: HOSPADM

## 2018-04-11 RX ORDER — ENOXAPARIN SODIUM 100 MG/ML
40 INJECTION SUBCUTANEOUS EVERY 24 HOURS
Status: DISCONTINUED | OUTPATIENT
Start: 2018-04-11 | End: 2018-04-14 | Stop reason: HOSPADM

## 2018-04-11 RX ORDER — ACETAMINOPHEN 325 MG/1
650 TABLET ORAL
Status: DISCONTINUED | OUTPATIENT
Start: 2018-04-11 | End: 2018-04-14 | Stop reason: HOSPADM

## 2018-04-11 RX ADMIN — CEFTRIAXONE 1 G: 1 INJECTION, POWDER, FOR SOLUTION INTRAMUSCULAR; INTRAVENOUS at 18:10

## 2018-04-11 RX ADMIN — IOPAMIDOL 100 ML: 755 INJECTION, SOLUTION INTRAVENOUS at 12:05

## 2018-04-11 RX ADMIN — Medication 10 ML: at 09:32

## 2018-04-11 RX ADMIN — PERFLUTREN 1.5 ML: 6.52 INJECTION, SUSPENSION INTRAVENOUS at 10:24

## 2018-04-11 RX ADMIN — SODIUM CHLORIDE 100 ML: 900 INJECTION, SOLUTION INTRAVENOUS at 12:05

## 2018-04-11 RX ADMIN — IOHEXOL 50 ML: 240 INJECTION, SOLUTION INTRATHECAL; INTRAVASCULAR; INTRAVENOUS; ORAL at 12:04

## 2018-04-11 RX ADMIN — ACETAMINOPHEN 650 MG: 325 TABLET, FILM COATED ORAL at 20:13

## 2018-04-11 RX ADMIN — Medication 10 ML: at 21:47

## 2018-04-11 RX ADMIN — SODIUM CHLORIDE 75 ML/HR: 900 INJECTION, SOLUTION INTRAVENOUS at 14:10

## 2018-04-11 RX ADMIN — ENOXAPARIN SODIUM 40 MG: 40 INJECTION SUBCUTANEOUS at 09:44

## 2018-04-11 RX ADMIN — ONDANSETRON HYDROCHLORIDE 4 MG: 2 INJECTION INTRAMUSCULAR; INTRAVENOUS at 06:23

## 2018-04-11 RX ADMIN — ATORVASTATIN CALCIUM 10 MG: 10 TABLET, FILM COATED ORAL at 21:47

## 2018-04-11 RX ADMIN — SODIUM CHLORIDE 75 ML/HR: 900 INJECTION, SOLUTION INTRAVENOUS at 01:10

## 2018-04-11 RX ADMIN — CETIRIZINE HYDROCHLORIDE 10 MG: 10 TABLET, FILM COATED ORAL at 09:44

## 2018-04-11 NOTE — PROGRESS NOTES
1500- TRANSFER - OUT REPORT:    Verbal report given to [facility] rn (name) on Lois Ship  being transferred to  bed 210 (unit) for routine progression of care       Report consisted of patients Situation, Background, Assessment and   Recommendations(SBAR). Information from the following report(s) SBAR, Kardex, ED Summary, Procedure Summary, Intake/Output, MAR, Recent Results and Cardiac Rhythm NSR was reviewed with the receiving nurse. Lines:   Peripheral IV 04/11/18 Left Forearm (Active)   Site Assessment Clean, dry, & intact 4/11/2018  2:10 PM   Phlebitis Assessment 0 4/11/2018  2:10 PM   Infiltration Assessment 0 4/11/2018  2:10 PM   Dressing Status New 4/11/2018  2:10 PM   Dressing Type Transparent;Tape 4/11/2018  2:10 PM   Hub Color/Line Status Blue; Infusing;Flushed 4/11/2018  2:10 PM        Opportunity for questions and clarification was provided.       Patient transported with:   Monitor  Tech

## 2018-04-11 NOTE — ED NOTES
04:04 Pt repositioned on hospital bed for comfort. Pt was able to ambulate from stretcher to hospital chair to bed with no distress. Belongings placed in belongings bag. No changes from initial assessment noted. Pt has no complaints at this time. Left forearm IV showed signs of infiltration after specimen collection and flushing with NS. IV taken out. Call bell placed within reach, on the monitor x3 and lights dimmed for comfort. Will continue to monitor.

## 2018-04-11 NOTE — ED NOTES
06:06 Pt sitting up in bed and reports that stomach \"feels upset. \" No PRN nausea medications noted in chart. MD paged. 06:12 MD called back. Telephone orders received for PRN zofran. Pt on bedside commode at this time. Pt reports feeling better after using commode but still a little nauseas. 06:46 breakfast tray ordered.

## 2018-04-11 NOTE — ED NOTES
Pt provided with leonardo crackers and water per request. Pt c/o of stomach feeling empty and requested an emesis bag. Pt refuses nausea medication at this time. Pt talkative at this time and appears to be in no distress. Will continue to monitor.

## 2018-04-11 NOTE — PROGRESS NOTES
Bedside shift change report given to Jose Kerns RN (oncoming nurse) by Suzette Holder RN (offgoing nurse). Report included the following information SBAR, Kardex and MAR.

## 2018-04-11 NOTE — ED NOTES
Pt resting quietly on hospital bed with eyes closed. VSS and appears to be in no distress. 2 siderails up. Call bell within reach. Will continue to monitor.

## 2018-04-11 NOTE — PROGRESS NOTES
Care Management-Patient with a past medical history of HTN and sepsis in 12/2015. Patient to ED for fever, headache for 3 days, malaise, nausea and vomiting at the beginning of these symptoms, hallucinating, and suprapubic discomfort. Reason for Admission:    Severe Sepsis    RRAT Score:      8  Plan for utilizing home health:       none  Likelihood of Readmission:      low   Transition of Care Plan:  TBD by discharging MD.     Met with patient in the room. Patient confirmed her address and emergency contacts. She has no family in the area. Contacts are listed below. She does not have a ACP. Offered to have pastoral care come speak with her about completing one. Patient's son is an  in [place]. She would rather have him assist her with completing the ACP. ADLs: independent  Pharmacy: [facility] [address] [facility]  PCP: Dr. Nicholas Duncan. She last saw him in December 2017. DME: none  ADL: independent  Previous HH or rehab: none  Emergency Contacts: 6-[id number] Dina Swan ([phone])   8-lig-OlwqljaSly Moreno in [place] ([phone])    No transitional care needs identified at this time. Care Manager will be available to assist with needs if they arise. Care Management Interventions  PCP Verified by CM: Yes (Dr. Nicholas Duncan)  Last Visit to PCP: 12/01/17  Mode of Transport at Discharge: Other (see comment) (friend)  Transition of Care Consult (CM Consult):  Other  Discharge Durable Medical Equipment: No  Physical Therapy Consult: No  Occupational Therapy Consult: No  Speech Therapy Consult: No  Current Support Network: Lives Alone  Plan discussed with Pt/Family/Caregiver: Yes  Discharge Location  Discharge Placement: [address] [place] [address]

## 2018-04-11 NOTE — ED NOTES
Spoke with Hospitalist MD walker diet and bed order. He will make changes if proper after CT results come in. Charge RN informed.

## 2018-04-11 NOTE — PROGRESS NOTES
Problem: Falls - Risk of  Goal: *Absence of Falls  Document Cee Fall Risk and appropriate interventions in the flowsheet.    Outcome: Progressing Towards Goal  Fall Risk Interventions:            Medication Interventions: Bed/chair exit alarm

## 2018-04-11 NOTE — ED NOTES
Bedside and Verbal shift change report received from SHIELA Escalante (offgoing nurse). Report included the following information SBAR, ED Summary, MAR and Recent Results.

## 2018-04-11 NOTE — ED NOTES
Bedside and Verbal shift change report given to SHIELA Dunham (oncoming nurse) by Hoa Francisco RN (offgoing nurse). Report included the following information SBAR, ED Summary, MAR and Recent Results. Pt had medium soft brown bm and 400 mL of carine urine output. Commode emptied.

## 2018-04-11 NOTE — PROGRESS NOTES
Hospitalist Progress Note  Michelle Gilliland MD  Answering service: [phone] from in house phone        Date of Service:  2018  NAME:  Ekta Hammond  :  1945  MRN:  [id number]      Admission Summary:   Ekta Hammond is a 67 y.o. female with HTN presented to the ED from home with headache, nausea, vomiting, fatigue, weakness, and urinary complaints. Presentation was similar to her previous admission in Dec 2015. Patient started vomiting about 3 days ago and has had multiple episodes. She c/o chills and subjective fever and dysuria. She has not been recently hospitalized or on antibiotics and denies sick contacts. Patient was told in the past not to take blood pressure meds but was recently started on triamterene-HCTZ. Interval history / Subjective:     Still having nausea and mild abdominal pain. Assessment & Plan:     1. Severe sepsis due to UTI ([address] (POA)  Improving BP responded to IVF, still holding home BP meds. BCx and UCx are 4/10 pending. Started on ceftriaxone. PT/OT evals prior to discharge     2. Hypokalemia (POA) Improving slowly.     3. Hyponatremia, recurrent (POA) asymptomatic likely due to dehydration from GI loss from vomiting and decreased po intake. Improving almost near to the baseline. Continue gentle hydration. Stopped triamteren-HCTZ, recheck labs in AM.    4.      Nasuea, vomiting and abdominal pain, most likely due to # 1. Will do CT abdomen. Monitor closely. 5.     Hyperlipidemia, stable on lipitor,     6.     Nicotine dependence (POA) - cessation counseling, decline nicotine patch.     7.     Hypertension, stable BP holding meds.     Diet: CLD and ADAT  Activity: ambulate with assistance  Isolation precautions: none  Consultations: none  Code status: Full  DVT prophylaxis:Lovenox    Care Plan discussed with: Patient/Family and Nurse  Disposition: Home w/Family and TBD     Hospital Problems  Date Reviewed: 4/10/2018          Codes Class Noted POA    * (Principal)Severe sepsis ([facility] [facility] ICD-10-CM: A41.9, R65.20  ICD-9-CM: 038.9, 995.92  4/10/2018 Yes        UTI (urinary tract infection) ICD-10-CM: N39.0  ICD-9-CM: 599.0  4/10/2018 Yes        Hypokalemia ICD-10-CM: E87.6  ICD-9-CM: 276.8  4/10/2018 Yes        Hyponatremia ICD-10-CM: E87.1  ICD-9-CM: 276.1  4/10/2018 Yes        Nicotine dependence (Chronic) ICD-10-CM: R85.749  ICD-9-CM: 305.1  4/10/2018 Yes                Review of Systems:   A comprehensive review of systems was negative except for that written in the HPI. Vital Signs:    Last 24hrs VS reviewed since prior progress note. Most recent are:  Visit Vitals    /60    Pulse 82    Temp 98.6 °F (37 °C)    Resp 19    Ht 5' 10\" (1.778 m)    Wt 81.2 kg (179 lb 0.2 oz)    SpO2 95%    BMI 25.69 kg/m2         Intake/Output Summary (Last 24 hours) at 04/11/18 0734  Last data filed at 04/10/18 2100   Gross per 24 hour   Intake                0 ml   Output             1000 ml   Net            -1000 ml        Physical Examination:             Constitutional:  No acute distress, cooperative, pleasant    ENT:  Oral mucous moist, oropharynx benign. Neck supple,    Resp:  CTA bilaterally. No wheezing/rhonchi/rales. No accessory muscle use   CV:  Regular rhythm, normal rate, no murmurs, gallops, rubs    GI:  Soft, non distended, non tender. normoactive bowel sounds, no hepatosplenomegaly     Musculoskeletal:  No edema, warm, 2+ pulses throughout    Neurologic:  Moves all extremities.   AAOx3, CN II-XII reviewed            Data Review:    Review and/or order of clinical lab test      Labs:     Recent Labs      04/11/18   0344  04/10/18   1007   WBC  7.4  9.6   HGB  14.6  14.9   HCT  41.5  41.4   PLT  159  148*     Recent Labs      04/11/18   0344  04/10/18   0857   NA  134*  128*   K  4.0  2.8*   CL  102  91*   CO2  25  28   BUN  9  12   CREA  0.72  1.10*   GLU  161*  199*   CA  8.2*  9.0 MG   --   1.6     Recent Labs      04/11/18   0344  04/10/18   0857   SGOT  26  14*   ALT  14  16   AP  61  86   TBILI  0.6  1.2*   TP  5.7*  7.2   ALB  2.2*  3.0*   GLOB  3.5  4.2*     No results for input(s): INR, PTP, APTT in the last 72 hours. No lab exists for component: INREXT   No results for input(s): FE, TIBC, PSAT, FERR in the last 72 hours. No results found for: FOL, RBCF   No results for input(s): PH, PCO2, PO2 in the last 72 hours.   Recent Labs      04/10/18   0857   TROIQ  <0.04     No results found for: CHOL, CHOLX, CHLST, CHOLV, HDL, LDL, LDLC, DLDLP, TGLX, TRIGL, TRIGP, CHHD, CHHDX  No results found for: [facility]  Lab Results   Component Value Date/Time    Color DARK YELLOW 04/10/2018 08:57 AM    Appearance CLEAR 04/10/2018 08:57 AM    Specific gravity 1.020 04/10/2018 08:57 AM    Specific gravity 1.013 12/13/2015 04:02 PM    pH (UA) 6.0 04/10/2018 08:57 AM    Protein 30 04/10/2018 08:57 AM    Glucose NEGATIVE  04/10/2018 08:57 AM    Ketone NEGATIVE  04/10/2018 08:57 AM    Bilirubin NEGATIVE  12/13/2015 04:02 PM    Urobilinogen 0.2 04/10/2018 08:57 AM    Nitrites POSITIVE 04/10/2018 08:57 AM    Leukocyte Esterase LARGE 04/10/2018 08:57 AM    Epithelial cells MANY (A) 04/10/2018 08:57 AM    Bacteria 4+ (A) 04/10/2018 08:57 AM    WBC >100 (H) 04/10/2018 08:57 AM    RBC 10-20 04/10/2018 08:57 AM         Medications Reviewed:     Current Facility-Administered Medications   Medication Dose Route Frequency    ondansetron (ZOFRAN) injection 4 mg  4 mg IntraVENous Q4H PRN    atorvastatin (LIPITOR) tablet 10 mg  10 mg Oral QHS    cetirizine (ZYRTEC) tablet 10 mg  10 mg Oral DAILY    zolpidem (AMBIEN) tablet 5 mg  5 mg Oral QHS PRN    sodium chloride (NS) flush 5-10 mL  5-10 mL IntraVENous PRN    cefTRIAXone (ROCEPHIN) 1 g in 0.9% sodium chloride (MBP/ADV) 50 mL  1 g IntraVENous Q24H    0.9% sodium chloride infusion  75 mL/hr IntraVENous CONTINUOUS     Current Outpatient Prescriptions Medication Sig    atorvastatin (LIPITOR) 10 mg tablet Take 10 mg by mouth daily.  triamterene-hydroCHLOROthiazide (MAXZIDE) 37.5-25 mg per tablet Take 1 Tab by mouth daily.  aspirin-caffeine (ANACIN) 400-32 mg tab Take 2 Tabs by mouth daily.  cetirizine (ZYRTEC) 10 mg tablet Take 10 mg by mouth daily.     zolpidem (AMBIEN) 5 mg tablet Take  by mouth nightly as needed for Sleep.     ______________________________________________________________________  EXPECTED LENGTH OF STAY: - - -  ACTUAL LENGTH OF STAY:          1                 Jennifer Santillan MD

## 2018-04-12 LAB
ALBUMIN SERPL-MCNC: 2 G/DL (ref 3.5–5)
ALBUMIN/GLOB SERPL: 0.6 {RATIO} (ref 1.1–2.2)
ALP SERPL-CCNC: 53 U/L (ref 45–117)
ALT SERPL-CCNC: 16 U/L (ref 12–78)
ANION GAP SERPL CALC-SCNC: 7 MMOL/L (ref 5–15)
AST SERPL-CCNC: 14 U/L (ref 15–37)
BACTERIA SPEC CULT: ABNORMAL
BILIRUB SERPL-MCNC: 0.5 MG/DL (ref 0.2–1)
BUN SERPL-MCNC: 9 MG/DL (ref 6–20)
BUN/CREAT SERPL: 13 (ref 12–20)
CALCIUM SERPL-MCNC: 8.2 MG/DL (ref 8.5–10.1)
CC UR VC: ABNORMAL
CHLORIDE SERPL-SCNC: 103 MMOL/L (ref 97–108)
CO2 SERPL-SCNC: 27 MMOL/L (ref 21–32)
CREAT SERPL-MCNC: 0.67 MG/DL (ref 0.55–1.02)
ERYTHROCYTE [DISTWIDTH] IN BLOOD BY AUTOMATED COUNT: 13.2 % (ref 11.5–14.5)
GLOBULIN SER CALC-MCNC: 3.2 G/DL (ref 2–4)
GLUCOSE SERPL-MCNC: 114 MG/DL (ref 65–100)
HCT VFR BLD AUTO: 39.8 % (ref 35–47)
HGB BLD-MCNC: 13.8 G/DL (ref 11.5–16)
MAGNESIUM SERPL-MCNC: 1.7 MG/DL (ref 1.6–2.4)
MCH RBC QN AUTO: 32.2 PG (ref 26–34)
MCHC RBC AUTO-ENTMCNC: 34.7 G/DL (ref 30–36.5)
MCV RBC AUTO: 93 FL (ref 80–99)
NRBC # BLD: 0 K/UL (ref 0–0.01)
NRBC BLD-RTO: 0 PER 100 WBC
PLATELET # BLD AUTO: 153 K/UL (ref 150–400)
PMV BLD AUTO: 9.6 FL (ref 8.9–12.9)
POTASSIUM SERPL-SCNC: 3.4 MMOL/L (ref 3.5–5.1)
PROT SERPL-MCNC: 5.2 G/DL (ref 6.4–8.2)
RBC # BLD AUTO: 4.28 M/UL (ref 3.8–5.2)
SERVICE CMNT-IMP: ABNORMAL
SODIUM SERPL-SCNC: 137 MMOL/L (ref 136–145)
WBC # BLD AUTO: 6 K/UL (ref 3.6–11)

## 2018-04-12 PROCEDURE — 85027 COMPLETE CBC AUTOMATED: CPT | Performed by: HOSPITALIST

## 2018-04-12 PROCEDURE — 74011250636 HC RX REV CODE- 250/636: Performed by: HOSPITALIST

## 2018-04-12 PROCEDURE — 74011000258 HC RX REV CODE- 258: Performed by: INTERNAL MEDICINE

## 2018-04-12 PROCEDURE — 83735 ASSAY OF MAGNESIUM: CPT | Performed by: HOSPITALIST

## 2018-04-12 PROCEDURE — 74011250637 HC RX REV CODE- 250/637: Performed by: HOSPITALIST

## 2018-04-12 PROCEDURE — 74011250637 HC RX REV CODE- 250/637: Performed by: INTERNAL MEDICINE

## 2018-04-12 PROCEDURE — 80053 COMPREHEN METABOLIC PANEL: CPT | Performed by: HOSPITALIST

## 2018-04-12 PROCEDURE — 65660000000 HC RM CCU STEPDOWN

## 2018-04-12 PROCEDURE — 74011250636 HC RX REV CODE- 250/636: Performed by: INTERNAL MEDICINE

## 2018-04-12 PROCEDURE — 36415 COLL VENOUS BLD VENIPUNCTURE: CPT | Performed by: HOSPITALIST

## 2018-04-12 RX ADMIN — ENOXAPARIN SODIUM 40 MG: 40 INJECTION SUBCUTANEOUS at 09:38

## 2018-04-12 RX ADMIN — CETIRIZINE HYDROCHLORIDE 10 MG: 10 TABLET, FILM COATED ORAL at 09:38

## 2018-04-12 RX ADMIN — SODIUM CHLORIDE 75 ML/HR: 900 INJECTION, SOLUTION INTRAVENOUS at 20:24

## 2018-04-12 RX ADMIN — ATORVASTATIN CALCIUM 10 MG: 10 TABLET, FILM COATED ORAL at 22:21

## 2018-04-12 RX ADMIN — ACETAMINOPHEN 650 MG: 325 TABLET, FILM COATED ORAL at 12:14

## 2018-04-12 RX ADMIN — CEFTRIAXONE 1 G: 1 INJECTION, POWDER, FOR SOLUTION INTRAMUSCULAR; INTRAVENOUS at 17:37

## 2018-04-12 RX ADMIN — ACETAMINOPHEN 650 MG: 325 TABLET, FILM COATED ORAL at 22:21

## 2018-04-12 NOTE — PROGRESS NOTES
Spiritual Care Partner Volunteer visited patient in Rm 210 on 4/12/18. Documented by:   Chaplain Hernandez MDiv, MACE  [phone]

## 2018-04-12 NOTE — PROGRESS NOTES
Primary Nurse Hola Doyle Score, RN performed a dual skin assessment on this patient No impairment noted  West score is 19

## 2018-04-12 NOTE — PROGRESS NOTES
Hospitalist Progress Note  Daquan Renee MD  Answering service: [phone] from in house phone        Date of Service:  2018  NAME:  Dorina Miramontes  :  1945  MRN:  [id number]      Admission Summary:   Dorina Miramontes is a 67 y.o. female with HTN presented to the ED from home with headache, nausea, vomiting, fatigue, weakness, and urinary complaints. Presentation was similar to her previous admission in Dec 2015. Patient started vomiting about 3 days ago and has had multiple episodes. She c/o chills and subjective fever and dysuria. She has not been recently hospitalized or on antibiotics and denies sick contacts. Patient was told in the past not to take blood pressure meds but was recently started on triamterene-HCTZ. Interval history / Subjective:     Still having nausea and mild abdominal pain. Assessment & Plan:     1. Sepsis due to pyelonephritis ([address] (POA)  Improving BP responded well to IVF, still holding home BP meds. BCx and UCx are 4/10 pending. Started on ceftriaxone. PT/OT evals prior to discharge. CT abdomen is reviewed.     2. Hypokalemia (POA) Improving slowly.     3. Hyponatremia, recurrent (POA) asymptomatic likely due to dehydration from GI loss from vomiting and decreased po intake. Improving almost near to the baseline. Continue gentle hydration. Stopped triamterene-HCTZ, recheck labs in AM.    4.      Hyperlipidemia, stable on lipitor,     5.     Nicotine dependence (POA) - cessation counseling, decline nicotine patch.     6.     Hypertension, stable BP holding meds.     Diet: CLD and ADAT  Activity: ambulate with assistance  Isolation precautions: none  Consultations: none  Code status: Full  DVT prophylaxis:Lovenox    Care Plan discussed with: Patient/Family and Nurse  Disposition: Home w/Family and TBD     Hospital Problems  Date Reviewed: 4/10/2018          Codes Class Noted POA    * (Principal)Severe sepsis ([facility] ICD-10-CM: A41.9, R65.20  ICD-9-CM: 038.9, 995.92  4/10/2018 Yes        UTI (urinary tract infection) ICD-10-CM: N39.0  ICD-9-CM: 599.0  4/10/2018 Yes        Hypokalemia ICD-10-CM: E87.6  ICD-9-CM: 276.8  4/10/2018 Yes        Hyponatremia ICD-10-CM: E87.1  ICD-9-CM: 276.1  4/10/2018 Yes        Nicotine dependence (Chronic) ICD-10-CM: C24.138  ICD-9-CM: 305.1  4/10/2018 Yes                Review of Systems:   A comprehensive review of systems was negative except for that written in the HPI. Vital Signs:    Last 24hrs VS reviewed since prior progress note. Most recent are:  Visit Vitals    /71 (BP 1 Location: Left arm, BP Patient Position: At rest)    Pulse 70    Temp 97.5 °F (36.4 °C)    Resp 16    Ht 5' 10\" (1.778 m)    Wt 85.3 kg (188 lb)    SpO2 90%    BMI 26.98 kg/m2         Intake/Output Summary (Last 24 hours) at 04/12/18 0719  Last data filed at 04/11/18 1410   Gross per 24 hour   Intake             2635 ml   Output              601 ml   Net             2034 ml        Physical Examination:             Constitutional:  No acute distress, cooperative, pleasant    ENT:  Oral mucous moist, oropharynx benign. Neck supple,    Resp:  CTA bilaterally. No wheezing/rhonchi/rales. No accessory muscle use   CV:  Regular rhythm, normal rate, no murmurs, gallops, rubs    GI:  Soft, non distended, non tender. normoactive bowel sounds, no hepatosplenomegaly     Musculoskeletal:  No edema, warm, 2+ pulses throughout    Neurologic:  Moves all extremities.   AAOx3, CN II-XII reviewed            Data Review:    Review and/or order of clinical lab test      Labs:     Recent Labs      04/12/18   0104 04/11/18   0344   WBC  6.0  7.4   HGB  13.8  14.6   HCT  39.8  41.5   PLT  153  159     Recent Labs      04/12/18   0104  04/11/18   0344  04/10/18   0857   NA  137  134*  128*   K  3.4*  4.0  2.8*   CL  103  102  91*   CO2  27  25  28   BUN  9  9  12   CREA  0.67  0.72  1.10*   GLU 114*  161*  199*   CA  8.2*  8.2*  9.0   MG  1.7   --   1.6     Recent Labs      04/12/18   0104  04/11/18   0344  04/10/18   0857   SGOT  14*  26  14*   ALT  16  14  16   AP  53  61  86   TBILI  0.5  0.6  1.2*   TP  5.2*  5.7*  7.2   ALB  2.0*  2.2*  3.0*   GLOB  3.2  3.5  4.2*     No results for input(s): INR, PTP, APTT in the last 72 hours. No lab exists for component: INREXT, INREXT   No results for input(s): FE, TIBC, PSAT, FERR in the last 72 hours. No results found for: FOL, RBCF   No results for input(s): PH, PCO2, PO2 in the last 72 hours.   Recent Labs      04/10/18   0857   TROIQ  <0.04     No results found for: CHOL, CHOLX, CHLST, CHOLV, HDL, LDL, LDLC, DLDLP, TGLX, TRIGL, TRIGP, CHHD, CHHDX  No results found for: [facility]  Lab Results   Component Value Date/Time    Color DARK YELLOW 04/10/2018 08:57 AM    Appearance CLEAR 04/10/2018 08:57 AM    Specific gravity 1.020 04/10/2018 08:57 AM    Specific gravity 1.013 12/13/2015 04:02 PM    pH (UA) 6.0 04/10/2018 08:57 AM    Protein 30 04/10/2018 08:57 AM    Glucose NEGATIVE  04/10/2018 08:57 AM    Ketone NEGATIVE  04/10/2018 08:57 AM    Bilirubin NEGATIVE  12/13/2015 04:02 PM    Urobilinogen 0.2 04/10/2018 08:57 AM    Nitrites POSITIVE 04/10/2018 08:57 AM    Leukocyte Esterase LARGE 04/10/2018 08:57 AM    Epithelial cells MANY (A) 04/10/2018 08:57 AM    Bacteria 4+ (A) 04/10/2018 08:57 AM    WBC >100 (H) 04/10/2018 08:57 AM    RBC 10-20 04/10/2018 08:57 AM         Medications Reviewed:     Current Facility-Administered Medications   Medication Dose Route Frequency    ondansetron (ZOFRAN) injection 4 mg  4 mg IntraVENous Q4H PRN    enoxaparin (LOVENOX) injection 40 mg  40 mg SubCUTAneous Q24H    acetaminophen (TYLENOL) tablet 650 mg  650 mg Oral Q6H PRN    atorvastatin (LIPITOR) tablet 10 mg  10 mg Oral QHS    cetirizine (ZYRTEC) tablet 10 mg  10 mg Oral DAILY    zolpidem (AMBIEN) tablet 5 mg  5 mg Oral QHS PRN    sodium chloride (NS) flush 5-10 mL 5-10 mL IntraVENous PRN    cefTRIAXone (ROCEPHIN) 1 g in 0.9% sodium chloride (MBP/ADV) 50 mL  1 g IntraVENous Q24H    0.9% sodium chloride infusion  75 mL/hr IntraVENous CONTINUOUS     ______________________________________________________________________  EXPECTED LENGTH OF STAY: 3d 16h  ACTUAL LENGTH OF STAY:          2                 Heather Whiteside MD

## 2018-04-13 PROCEDURE — 74011250637 HC RX REV CODE- 250/637: Performed by: INTERNAL MEDICINE

## 2018-04-13 PROCEDURE — 74011250636 HC RX REV CODE- 250/636: Performed by: INTERNAL MEDICINE

## 2018-04-13 PROCEDURE — 65660000000 HC RM CCU STEPDOWN

## 2018-04-13 PROCEDURE — 74011250636 HC RX REV CODE- 250/636: Performed by: HOSPITALIST

## 2018-04-13 PROCEDURE — 74011000258 HC RX REV CODE- 258: Performed by: INTERNAL MEDICINE

## 2018-04-13 PROCEDURE — 74011250637 HC RX REV CODE- 250/637: Performed by: HOSPITALIST

## 2018-04-13 RX ADMIN — ACETAMINOPHEN 650 MG: 325 TABLET, FILM COATED ORAL at 22:36

## 2018-04-13 RX ADMIN — ZOLPIDEM TARTRATE 5 MG: 5 TABLET ORAL at 22:36

## 2018-04-13 RX ADMIN — ATORVASTATIN CALCIUM 10 MG: 10 TABLET, FILM COATED ORAL at 22:36

## 2018-04-13 RX ADMIN — CEFTRIAXONE 1 G: 1 INJECTION, POWDER, FOR SOLUTION INTRAMUSCULAR; INTRAVENOUS at 01:00

## 2018-04-13 RX ADMIN — CETIRIZINE HYDROCHLORIDE 10 MG: 10 TABLET, FILM COATED ORAL at 10:11

## 2018-04-13 RX ADMIN — ONDANSETRON HYDROCHLORIDE 4 MG: 2 INJECTION INTRAMUSCULAR; INTRAVENOUS at 12:08

## 2018-04-13 RX ADMIN — ENOXAPARIN SODIUM 40 MG: 40 INJECTION SUBCUTANEOUS at 07:28

## 2018-04-13 NOTE — PROGRESS NOTES
Bedside shift change report given to Adelaida Mcgowan (oncoming nurse) by Jaydon Carranza (offgoing nurse). Report included the following information SBAR, Kardex and MAR.

## 2018-04-13 NOTE — PROGRESS NOTES
Hospitalist Progress Note            Amanda Xiong a 67 y. o. female with HTN presented to the ED from home with headache, nausea, vomiting, fatigue, weakness, and urinary complaints. Presentation was similar to her previous admission in Dec 2015. Patient started vomiting about 3 days ago and has had multiple episodes. She c/o chills and subjective fever and dysuria. She has not been recently hospitalized or on antibiotics and denies sick contacts. Patient was told in the past not to take blood pressure meds but was recently started on triamterene-HCTZ. Daily Progress Note: 2018    Assessment/Plan:   1. Sepsis due to pyelonephritis (POA) - Klebsiella  - continue Rocephin  - f/u blood cxs  - PT/ OT eval today  - advance diet      2.      Hypokalemia (POA) Improving slowly.      3.      Hyponatremia, recurrent (POA)   - resolved  - no HCTZ on d/c     4. Hyperlipidemia  - stable on lipitor      5. Nicotine dependence (POA)   - cessation counseling, decline nicotine patch.     6. Hypertension   - stable BP holding meds    DISPO: likely home in AM depending on PT/ OT evals       Subjective:   Feels \"shitty\", but better than a few days ago. Still weak and has concerns that she lives alone. Overnight events discussed with nursing. Review of Systems:   No CP, no SOB. Objective:     Visit Vitals    /73 (BP 1 Location: Right arm, BP Patient Position: At rest)    Pulse (!) 57    Temp 97.6 °F (36.4 °C)    Resp 16    Ht 5' 10\" (1.778 m)    Wt 85.7 kg (189 lb)    SpO2 94%    BMI 27.12 kg/m2      O2 Device: Room air    Temp (24hrs), Av.8 °F (36.6 °C), Min:97 °F (36.1 °C), Max:98.4 °F (36.9 °C)                EXAM:  General: WD, WN. Alert, cooperative, no acute distress    HEENT: NC, atraumatic. PERRL, EOMI. Anicteric sclerae. Neck:   Supple, trachea midline  Lungs:  CTA bilaterally. No Wheezing/Rhonchi/Rales.   Heart:  Regular rhythm,  No murmur (), No Rubs, No Gallops  Abdomen: Soft, Non distended, Non tender.  +Bowel sounds, no HSM  Extremities: No c/c/e  Neurologic:  CN 2-12 gi, Alert and oriented X 3. No acute neurological distress   Psych:   Good insight. Not anxious nor agitated. Data Review:     No results found for this or any previous visit (from the past 24 hour(s)).     Principal Problem:    Severe sepsis ([address] (4/10/2018)    Active Problems:    UTI (urinary tract infection) (4/10/2018)      Hypokalemia (4/10/2018)      Hyponatremia (4/10/2018)      Nicotine dependence (4/10/2018)        Medications reviewed  Current Facility-Administered Medications   Medication Dose Route Frequency    ondansetron (ZOFRAN) injection 4 mg  4 mg IntraVENous Q4H PRN    enoxaparin (LOVENOX) injection 40 mg  40 mg SubCUTAneous Q24H    acetaminophen (TYLENOL) tablet 650 mg  650 mg Oral Q6H PRN    atorvastatin (LIPITOR) tablet 10 mg  10 mg Oral QHS    cetirizine (ZYRTEC) tablet 10 mg  10 mg Oral DAILY    zolpidem (AMBIEN) tablet 5 mg  5 mg Oral QHS PRN    sodium chloride (NS) flush 5-10 mL  5-10 mL IntraVENous PRN    cefTRIAXone (ROCEPHIN) 1 g in 0.9% sodium chloride (MBP/ADV) 50 mL  1 g IntraVENous Q24H       DVT prophylaxis: Lovenox    Total time spent with patient: 20 minutes    Allie Musa MD

## 2018-04-13 NOTE — PROGRESS NOTES
Bedside shift change report given to Regina KUO (oncoming nurse) by Sharon Pulido (offgoing nurse). Report included the following information SBAR, Kardex and MAR.

## 2018-04-14 VITALS
HEIGHT: 70 IN | DIASTOLIC BLOOD PRESSURE: 66 MMHG | SYSTOLIC BLOOD PRESSURE: 126 MMHG | TEMPERATURE: 97.5 F | BODY MASS INDEX: 29.63 KG/M2 | HEART RATE: 68 BPM | OXYGEN SATURATION: 93 % | RESPIRATION RATE: 17 BRPM | WEIGHT: 207 LBS

## 2018-04-14 PROBLEM — A41.9 SEVERE SEPSIS (HCC): Status: RESOLVED | Noted: 2018-04-10 | Resolved: 2018-04-14

## 2018-04-14 PROBLEM — E87.1 HYPONATREMIA: Status: RESOLVED | Noted: 2018-04-10 | Resolved: 2018-04-14

## 2018-04-14 PROBLEM — N39.0 UTI (URINARY TRACT INFECTION): Status: RESOLVED | Noted: 2018-04-10 | Resolved: 2018-04-14

## 2018-04-14 PROBLEM — R65.20 SEVERE SEPSIS (HCC): Status: RESOLVED | Noted: 2018-04-10 | Resolved: 2018-04-14

## 2018-04-14 PROBLEM — E87.6 HYPOKALEMIA: Status: RESOLVED | Noted: 2018-04-10 | Resolved: 2018-04-14

## 2018-04-14 LAB
ANION GAP SERPL CALC-SCNC: 6 MMOL/L (ref 5–15)
BUN SERPL-MCNC: 9 MG/DL (ref 6–20)
BUN/CREAT SERPL: 15 (ref 12–20)
CALCIUM SERPL-MCNC: 8.3 MG/DL (ref 8.5–10.1)
CHLORIDE SERPL-SCNC: 104 MMOL/L (ref 97–108)
CO2 SERPL-SCNC: 27 MMOL/L (ref 21–32)
CREAT SERPL-MCNC: 0.61 MG/DL (ref 0.55–1.02)
GLUCOSE SERPL-MCNC: 145 MG/DL (ref 65–100)
POTASSIUM SERPL-SCNC: 3.6 MMOL/L (ref 3.5–5.1)
SODIUM SERPL-SCNC: 137 MMOL/L (ref 136–145)

## 2018-04-14 PROCEDURE — 74011250636 HC RX REV CODE- 250/636: Performed by: INTERNAL MEDICINE

## 2018-04-14 PROCEDURE — 36415 COLL VENOUS BLD VENIPUNCTURE: CPT | Performed by: INTERNAL MEDICINE

## 2018-04-14 PROCEDURE — 74011250636 HC RX REV CODE- 250/636: Performed by: HOSPITALIST

## 2018-04-14 PROCEDURE — 74011250637 HC RX REV CODE- 250/637: Performed by: INTERNAL MEDICINE

## 2018-04-14 PROCEDURE — 74011250637 HC RX REV CODE- 250/637: Performed by: HOSPITALIST

## 2018-04-14 PROCEDURE — 80048 BASIC METABOLIC PNL TOTAL CA: CPT | Performed by: INTERNAL MEDICINE

## 2018-04-14 RX ORDER — CEFUROXIME AXETIL 250 MG/1
250 TABLET ORAL 2 TIMES DAILY
Qty: 12 TAB | Refills: 0 | Status: SHIPPED | OUTPATIENT
Start: 2018-04-14 | End: 2018-04-20

## 2018-04-14 RX ADMIN — CETIRIZINE HYDROCHLORIDE 10 MG: 10 TABLET, FILM COATED ORAL at 08:47

## 2018-04-14 RX ADMIN — ONDANSETRON HYDROCHLORIDE 4 MG: 2 INJECTION INTRAMUSCULAR; INTRAVENOUS at 10:42

## 2018-04-14 RX ADMIN — ACETAMINOPHEN 650 MG: 325 TABLET, FILM COATED ORAL at 08:52

## 2018-04-14 RX ADMIN — ENOXAPARIN SODIUM 40 MG: 40 INJECTION SUBCUTANEOUS at 07:13

## 2018-04-14 NOTE — DISCHARGE INSTRUCTIONS
Discharge Instructions       PATIENT ID: Axel Adame  MRN: [id number]   YOB: 1945    DATE OF ADMISSION: 4/10/2018  8:22 AM    DATE OF DISCHARGE: 4/14/2018    PRIMARY CARE PROVIDER: Fernanda Salcedo MD     ATTENDING PHYSICIAN: Carlos Dsouza MD  DISCHARGING PROVIDER: Carlos Dsouza MD    To contact this individual call [phone] and ask the  to page. If unavailable ask to be transferred the Adult Hospitalist Department. DISCHARGE DIAGNOSES   1. Urinary tract infection  2. Pyelonephritis  3. Sepsis    CONSULTATIONS: IP CONSULT TO HOSPITALIST    PROCEDURES/SURGERIES: * No surgery found *    PENDING TEST RESULTS:   At the time of discharge the following test results are still pending: none    FOLLOW UP APPOINTMENTS:   Follow-up Information     Follow up With Details Comments Contact Info    Rebecca Allison MD Schedule an appointment as soon as possible for a visit in 1 week  [address]: none    DIET: Regular Diet    ACTIVITY: Activity as tolerated    WOUND CARE: none    EQUIPMENT needed: none      DISCHARGE MEDICATIONS:   See Medication Reconciliation Form    · It is important that you take the medication exactly as they are prescribed. · Keep your medication in the bottles provided by the pharmacist and keep a list of the medication names, dosages, and times to be taken in your wallet. · Do not take other medications without consulting your doctor. NOTIFY YOUR PHYSICIAN FOR ANY OF THE FOLLOWING:   Fever over 101 degrees for 24 hours. Chest pain, shortness of breath, fever, chills, nausea, vomiting, diarrhea, change in mentation, falling, weakness, bleeding. Severe pain or pain not relieved by medications. Or, any other signs or symptoms that you may have questions about.       DISPOSITION:    Home With: SELF   OT  PT  HH  RN       SNF/Inpatient Rehab/LTAC Independent/assisted living    Hospice    Other:     CDMP Checked:   Yes x     PROBLEM LIST Updated:  Yes x       Signed:   Fran Brumfield MD  4/14/2018  8:40 AM

## 2018-04-14 NOTE — DISCHARGE SUMMARY
Discharge Summary       PATIENT ID: Giovanni Quinonez  MRN: [id number]   YOB: 1945    DATE OF ADMISSION: 4/10/2018  8:22 AM    DATE OF DISCHARGE: 4/14/18   PRIMARY CARE PROVIDER: Jarrett Chicas MD     ATTENDING PHYSICIAN: Dr. Ken Cuellar  DISCHARGING PROVIDER: Destinee More MD    To contact this individual call [phone] and ask the  to page. If unavailable ask to be transferred the Adult Hospitalist Department. CONSULTATIONS: IP CONSULT TO HOSPITALIST    PROCEDURES/SURGERIES: * No surgery found *    ADMITTING [address] [place] [address] COURSE:     1.     Sepsis due to pyelonephritis (POA) - Klebsiella  - started Rocephin and urine cultures grew klebsiella sensitive to cephalosporins   - blood cxs were negative  - sent out on PO Ceftin to complete a 10 day course      2.      Hypokalemia (POA) Improved with repletion      3.      Hyponatremia, recurrent (POA)   - resolved  - no HCTZ on d/c      4.      Hyperlipidemia  - stable on lipitor      5.     Nicotine dependence (POA)   - cessation counseling, decline nicotine patch.      6.     Hypertension   - stable BP without meds        PENDING TEST RESULTS:   At the time of discharge the following test results are still pending: none    FOLLOW UP APPOINTMENTS:    Follow-up Information     Follow up With Details Comments Contact Info    Aicha Cheng MD Schedule an appointment as soon as possible for a visit in 1 week  [address]  [phone]             ADDITIONAL CARE RECOMMENDATIONS: none    DIET: Regular Diet    ACTIVITY: Activity as tolerated    WOUND CARE: none    EQUIPMENT needed: none      DISCHARGE MEDICATIONS:  Current Discharge Medication List      START taking these medications    Details   cefUROXime (CEFTIN) 250 mg tablet Take 1 Tab by mouth two (2) times a day for 6 days.   Qty: 12 Tab, Refills: 0         CONTINUE these medications which have NOT CHANGED    Details   atorvastatin (LIPITOR) 10 mg tablet Take 10 mg by mouth daily. aspirin-caffeine (ANACIN) 400-32 mg tab Take 2 Tabs by mouth daily. cetirizine (ZYRTEC) 10 mg tablet Take 10 mg by mouth daily. zolpidem (AMBIEN) 5 mg tablet Take  by mouth nightly as needed for Sleep. STOP taking these medications       triamterene-hydroCHLOROthiazide (MAXZIDE) 37.5-25 mg per tablet Comments:   Reason for Stopping:                 NOTIFY YOUR PHYSICIAN FOR ANY OF THE FOLLOWING:   Fever over 101 degrees for 24 hours. Chest pain, shortness of breath, fever, chills, nausea, vomiting, diarrhea, change in mentation, falling, weakness, bleeding. Severe pain or pain not relieved by medications. Or, any other signs or symptoms that you may have questions about.     DISPOSITION:    Home With: SELF   OT  PT  HH  RN       Long term SNF/Inpatient Rehab    Independent/assisted living    Hospice    Other:       PATIENT CONDITION AT DISCHARGE:     Functional status    Poor     Deconditioned    x Independent      Cognition    x Lucid     Forgetful     Dementia      Catheters/lines (plus indication)    [place]     PICC     PEG    x None      Code status    x Full code     DNR      PHYSICAL EXAMINATION AT DISCHARGE:   Refer to Progress Note      CHRONIC MEDICAL DIAGNOSES:  Problem List as of 4/14/2018  Date Reviewed: 4/10/2018          Codes Class Noted - Resolved    Nicotine dependence (Chronic) ICD-10-CM: W47.527  ICD-9-CM: 305.1  4/10/2018 - Present        Tobacco dependence (Chronic) ICD-10-CM: W47.117  ICD-9-CM: 305.1  12/16/2015 - Present        Hypertension ICD-10-CM: I10  ICD-9-CM: 401.9  12/13/2015 - Present        Sepsis ([facility] ICD-10-CM: A41.9  ICD-9-CM: 038.9, 995.91  12/13/2015 - Present        UTI (lower urinary tract infection) ICD-10-CM: N39.0  ICD-9-CM: 599.0  12/13/2015 - Present        URI (upper respiratory infection) ICD-10-CM: J06.9  ICD-9-CM: 465.9  12/13/2015 - Present        * (Principal)RESOLVED: Severe sepsis ([facility] [address] ICD-10-CM: A41.9, R65.20  ICD-9-CM: 038.9, 995.92  4/10/2018 - 4/14/2018        RESOLVED: UTI (urinary tract infection) ICD-10-CM: N39.0  ICD-9-CM: 599.0  4/10/2018 - 4/14/2018        RESOLVED: Hypokalemia ICD-10-CM: E87.6  ICD-9-CM: 276.8  4/10/2018 - 4/14/2018        RESOLVED: Hyponatremia ICD-10-CM: E87.1  ICD-9-CM: 276.1  4/10/2018 - 4/14/2018        RESOLVED: Hyponatremia ICD-10-CM: E87.1  ICD-9-CM: 276.1  12/16/2015 - 12/16/2015              Greater than 30 minutes were spent with the patient on counseling and coordination of care    Signed:   Axel Ngo MD  4/14/2018  8:42 AM

## 2018-04-14 NOTE — PROGRESS NOTES
Patient seen and examined. Feels well. A little weak, but up and about under her own power. Eating well. No fevers.     AF, VSS  NAD  MMM  RRR  Lungs CTA    A/P:) UTI/ pyelonephritis - resolved    - plan to d/c home today with PO abx to finish out a 10 day course

## 2018-04-14 NOTE — PROGRESS NOTES
Care Management Discharge    Discharge orders written    Care Management Interventions  PCP Verified by CM: Yes (Dr. Meera Gonzalez)  Last Visit to PCP: 12/01/17  Mode of Transport at Discharge: Other (see comment) (friend)  Transition of Care Consult (CM Consult):  Other  Discharge Durable Medical Equipment: No  Physical Therapy Consult: No  Occupational Therapy Consult: No  Speech Therapy Consult: No  Current Support Network: Lives Alone  Plan discussed with Pt/Family/Caregiver: Yes (Discharging to home on PO antibitoics.)  Discharge Location  Discharge Placement: Home    JORDANA Yost

## 2018-04-14 NOTE — PROGRESS NOTES
0800 Bedside, Verbal and Written shift change report given to Natanael Matos RN (oncoming nurse) by Brinda Oropeza RN (offgoing nurse). Report included the following information SBAR, Kardex, Intake/Output, MAR and Recent Results.

## 2018-04-14 NOTE — PROGRESS NOTES
Bedside shift change report given to Darcy Avalos (oncoming nurse) by Faisal Light (offgoing nurse). Report included the following information SBAR, Kardex and MAR.

## 2018-04-14 NOTE — PROGRESS NOTES
Physical Therapy Note:  Consult received and chart reviewed. Introduced this writer and discussed the role of acute PT to which the patient stated, \"Well congratulations! \" Ns reports that the patient was ambulating independently in her room and to the [facility] station and back. The patient deferred intervention feeling like she was at baseline for functional mobility. Will sign off.    Ralph Morel, PT, DPT

## 2018-04-15 LAB
BACTERIA SPEC CULT: NORMAL
SERVICE CMNT-IMP: NORMAL